# Patient Record
Sex: FEMALE
[De-identification: names, ages, dates, MRNs, and addresses within clinical notes are randomized per-mention and may not be internally consistent; named-entity substitution may affect disease eponyms.]

---
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
## 2025-03-07 NOTE — HPI-TODAY'S VISIT:
2023 Sirisha presents for evaluation of epigastric pain, nausea and constipation. She recently had a baby by  in  of this year, then subsequently was found to have gallstones and her gallbladder was surgically removed 9/15/2023. Due to her symptoms further evaluation was ordered including a GES at [facility] patient states over 1 hour for the contents to start to move out of her stomach. We will request this report. She states a long history of eating small amounts and having abdominal pain. She has always gone 1-3 days between bowel movements. Her surgeon prescribed her Amitiza and lansoprazole in October but she had such stomach cramping she discontinued them both.  Her pain is worse after she eats, yet she also has stomach pain if she hasn't eaten in a long time.  She does not smoke or use marijuana. Today we discussed starting a bowel regimen and diet recommendations for GP along with trial of FDgard. She will return and discuss improvements.  SP  2024  Sirisha returns for follow up with history of gastroparesis. She has since Misplaced GP diet. Felt  especially worse when she ate [employer] chili cheese fries, subsequent emesis. Miralax and fiber did not help she did this daily since we saw her. She took famotidine at night as well with no improvement in upper GI symptoms.  24 Sirisha returns for follow up for GP and constipation. GP symptoms are mild, she was having initial response to Linzess with daily BM but then this waned and she is back to baseline depsite continuing Linzess 72 mcg daily. She also would like to consider EGD/Colon. We discussed labwork and possible endoscopy pending.  She will start with [employer] for employment soon. SP
Discharged